# Patient Record
Sex: FEMALE | Race: WHITE | NOT HISPANIC OR LATINO
[De-identification: names, ages, dates, MRNs, and addresses within clinical notes are randomized per-mention and may not be internally consistent; named-entity substitution may affect disease eponyms.]

---

## 2017-01-19 ENCOUNTER — OTHER (OUTPATIENT)
Age: 42
End: 2017-01-19

## 2017-01-19 ENCOUNTER — MEDICATION RENEWAL (OUTPATIENT)
Age: 42
End: 2017-01-19

## 2017-02-03 ENCOUNTER — APPOINTMENT (OUTPATIENT)
Dept: OBGYN | Facility: CLINIC | Age: 42
End: 2017-02-03

## 2017-02-03 VITALS
HEIGHT: 68 IN | HEART RATE: 56 BPM | DIASTOLIC BLOOD PRESSURE: 77 MMHG | BODY MASS INDEX: 34.71 KG/M2 | WEIGHT: 229 LBS | SYSTOLIC BLOOD PRESSURE: 117 MMHG

## 2017-02-03 DIAGNOSIS — Z87.448 PERSONAL HISTORY OF OTHER DISEASES OF URINARY SYSTEM: ICD-10-CM

## 2017-02-03 DIAGNOSIS — N84.1 POLYP OF CERVIX UTERI: ICD-10-CM

## 2017-02-03 DIAGNOSIS — R10.2 PELVIC AND PERINEAL PAIN: ICD-10-CM

## 2017-02-03 DIAGNOSIS — N93.9 ABNORMAL UTERINE AND VAGINAL BLEEDING, UNSPECIFIED: ICD-10-CM

## 2017-02-03 DIAGNOSIS — Z78.9 OTHER SPECIFIED HEALTH STATUS: ICD-10-CM

## 2017-02-03 DIAGNOSIS — M79.661 PAIN IN RIGHT LOWER LEG: ICD-10-CM

## 2017-02-03 DIAGNOSIS — Z87.898 PERSONAL HISTORY OF OTHER SPECIFIED CONDITIONS: ICD-10-CM

## 2017-02-06 ENCOUNTER — OTHER (OUTPATIENT)
Age: 42
End: 2017-02-06

## 2017-02-06 LAB
HCG UR QL: NEGATIVE
HPV HIGH+LOW RISK DNA PNL CVX: NEGATIVE
QUALITY CONTROL: YES

## 2017-02-08 ENCOUNTER — OTHER (OUTPATIENT)
Age: 42
End: 2017-02-08

## 2017-02-08 LAB — CYTOLOGY CVX/VAG DOC THIN PREP: NORMAL

## 2017-03-31 ENCOUNTER — APPOINTMENT (OUTPATIENT)
Dept: ULTRASOUND IMAGING | Facility: CLINIC | Age: 42
End: 2017-03-31

## 2017-03-31 ENCOUNTER — OUTPATIENT (OUTPATIENT)
Dept: OUTPATIENT SERVICES | Facility: HOSPITAL | Age: 42
LOS: 1 days | End: 2017-03-31
Payer: COMMERCIAL

## 2017-03-31 ENCOUNTER — APPOINTMENT (OUTPATIENT)
Dept: MAMMOGRAPHY | Facility: CLINIC | Age: 42
End: 2017-03-31

## 2017-03-31 DIAGNOSIS — Z00.8 ENCOUNTER FOR OTHER GENERAL EXAMINATION: ICD-10-CM

## 2017-03-31 PROCEDURE — 76641 ULTRASOUND BREAST COMPLETE: CPT

## 2017-03-31 PROCEDURE — 77063 BREAST TOMOSYNTHESIS BI: CPT

## 2017-03-31 PROCEDURE — 77067 SCR MAMMO BI INCL CAD: CPT

## 2017-04-03 ENCOUNTER — OTHER (OUTPATIENT)
Age: 42
End: 2017-04-03

## 2017-04-09 DIAGNOSIS — N64.9 DISORDER OF BREAST, UNSPECIFIED: ICD-10-CM

## 2017-04-10 ENCOUNTER — APPOINTMENT (OUTPATIENT)
Dept: MAMMOGRAPHY | Facility: CLINIC | Age: 42
End: 2017-04-10

## 2017-04-10 ENCOUNTER — OUTPATIENT (OUTPATIENT)
Dept: OUTPATIENT SERVICES | Facility: HOSPITAL | Age: 42
LOS: 1 days | End: 2017-04-10
Payer: COMMERCIAL

## 2017-04-10 DIAGNOSIS — N63 UNSPECIFIED LUMP IN BREAST: ICD-10-CM

## 2017-04-10 DIAGNOSIS — Z00.8 ENCOUNTER FOR OTHER GENERAL EXAMINATION: ICD-10-CM

## 2017-04-10 PROCEDURE — 77065 DX MAMMO INCL CAD UNI: CPT

## 2017-04-10 PROCEDURE — G0279: CPT

## 2017-04-17 PROBLEM — N64.9 BREAST DISORDER: Status: ACTIVE | Noted: 2017-04-17

## 2017-04-24 ENCOUNTER — RESULT REVIEW (OUTPATIENT)
Age: 42
End: 2017-04-24

## 2017-04-25 ENCOUNTER — OUTPATIENT (OUTPATIENT)
Dept: OUTPATIENT SERVICES | Facility: HOSPITAL | Age: 42
LOS: 1 days | End: 2017-04-25
Payer: COMMERCIAL

## 2017-04-25 ENCOUNTER — APPOINTMENT (OUTPATIENT)
Dept: MAMMOGRAPHY | Facility: CLINIC | Age: 42
End: 2017-04-25

## 2017-04-25 DIAGNOSIS — N64.9 DISORDER OF BREAST, UNSPECIFIED: ICD-10-CM

## 2017-04-25 PROCEDURE — 19081 BX BREAST 1ST LESION STRTCTC: CPT

## 2017-04-25 PROCEDURE — 77065 DX MAMMO INCL CAD UNI: CPT

## 2017-05-01 ENCOUNTER — OTHER (OUTPATIENT)
Age: 42
End: 2017-05-01

## 2017-11-30 ENCOUNTER — RX RENEWAL (OUTPATIENT)
Age: 42
End: 2017-11-30

## 2017-12-04 ENCOUNTER — OTHER (OUTPATIENT)
Age: 42
End: 2017-12-04

## 2018-03-19 ENCOUNTER — OTHER (OUTPATIENT)
Age: 43
End: 2018-03-19

## 2018-05-23 ENCOUNTER — OTHER (OUTPATIENT)
Age: 43
End: 2018-05-23

## 2018-06-11 ENCOUNTER — RX RENEWAL (OUTPATIENT)
Age: 43
End: 2018-06-11

## 2018-09-24 ENCOUNTER — TRANSCRIPTION ENCOUNTER (OUTPATIENT)
Age: 43
End: 2018-09-24

## 2018-10-22 ENCOUNTER — APPOINTMENT (OUTPATIENT)
Dept: OBGYN | Facility: CLINIC | Age: 43
End: 2018-10-22
Payer: COMMERCIAL

## 2018-10-22 VITALS
WEIGHT: 238 LBS | HEART RATE: 62 BPM | BODY MASS INDEX: 36.07 KG/M2 | HEIGHT: 68 IN | SYSTOLIC BLOOD PRESSURE: 153 MMHG | DIASTOLIC BLOOD PRESSURE: 88 MMHG

## 2018-10-22 PROCEDURE — 99396 PREV VISIT EST AGE 40-64: CPT

## 2018-11-02 LAB
C TRACH RRNA SPEC QL NAA+PROBE: NOT DETECTED
N GONORRHOEA RRNA SPEC QL NAA+PROBE: NOT DETECTED
SOURCE AMPLIFICATION: NORMAL

## 2018-11-30 ENCOUNTER — OUTPATIENT (OUTPATIENT)
Dept: OUTPATIENT SERVICES | Facility: HOSPITAL | Age: 43
LOS: 1 days | End: 2018-11-30
Payer: COMMERCIAL

## 2018-11-30 ENCOUNTER — APPOINTMENT (OUTPATIENT)
Dept: MAMMOGRAPHY | Facility: CLINIC | Age: 43
End: 2018-11-30
Payer: COMMERCIAL

## 2018-11-30 DIAGNOSIS — Z00.8 ENCOUNTER FOR OTHER GENERAL EXAMINATION: ICD-10-CM

## 2018-11-30 PROCEDURE — G0279: CPT

## 2018-11-30 PROCEDURE — G0279: CPT | Mod: 26

## 2018-11-30 PROCEDURE — 77066 DX MAMMO INCL CAD BI: CPT | Mod: 26

## 2018-11-30 PROCEDURE — 77066 DX MAMMO INCL CAD BI: CPT

## 2019-02-04 ENCOUNTER — RX RENEWAL (OUTPATIENT)
Age: 44
End: 2019-02-04

## 2019-02-06 ENCOUNTER — MEDICATION RENEWAL (OUTPATIENT)
Age: 44
End: 2019-02-06

## 2019-04-21 NOTE — PHYSICAL EXAM
[Awake] : awake [Alert] : alert [Soft] : soft [Oriented x3] : oriented to person, place, and time [Labia Majora] : labia major [Labia Minora] : labia minora [Normal] : clitoris [No Bleeding] : there was no active vaginal bleeding [Uterine Adnexae] : were not tender and not enlarged [No Tenderness] : no rectal tenderness [Nl Sphincter Tone] : normal sphincter tone [RRR, No Murmurs] : RRR, no murmurs [CTAB] : CTAB [Acute Distress] : no acute distress [LAD] : no lymphadenopathy [Thyroid Nodule] : no thyroid nodule [Goiter] : no goiter [Mass] : no breast mass [Nipple Discharge] : no nipple discharge [Axillary LAD] : no axillary lymphadenopathy [Tender] : non tender [Distended] : not distended [H/Smegaly] : no hepatosplenomegaly [Depressed Mood] : not depressed [Flat Affect] : affect not flat [Pap Obtained] : a Pap smear was not performed [FreeTextEntry5] : cervical cultures obtained [FreeTextEntry9] : confirms vaginal exam

## 2019-10-28 ENCOUNTER — APPOINTMENT (OUTPATIENT)
Dept: OBGYN | Facility: CLINIC | Age: 44
End: 2019-10-28
Payer: COMMERCIAL

## 2019-10-28 VITALS
HEART RATE: 45 BPM | HEIGHT: 68 IN | WEIGHT: 225 LBS | SYSTOLIC BLOOD PRESSURE: 134 MMHG | DIASTOLIC BLOOD PRESSURE: 85 MMHG | BODY MASS INDEX: 34.1 KG/M2

## 2019-10-28 VITALS — SYSTOLIC BLOOD PRESSURE: 141 MMHG | DIASTOLIC BLOOD PRESSURE: 78 MMHG

## 2019-10-28 DIAGNOSIS — R45.86 EMOTIONAL LABILITY: ICD-10-CM

## 2019-10-28 PROCEDURE — 99396 PREV VISIT EST AGE 40-64: CPT

## 2019-10-28 PROCEDURE — 99213 OFFICE O/P EST LOW 20 MIN: CPT | Mod: 25

## 2019-12-05 ENCOUNTER — FORM ENCOUNTER (OUTPATIENT)
Age: 44
End: 2019-12-05

## 2019-12-06 ENCOUNTER — APPOINTMENT (OUTPATIENT)
Dept: ULTRASOUND IMAGING | Facility: CLINIC | Age: 44
End: 2019-12-06
Payer: COMMERCIAL

## 2019-12-06 ENCOUNTER — OUTPATIENT (OUTPATIENT)
Dept: OUTPATIENT SERVICES | Facility: HOSPITAL | Age: 44
LOS: 1 days | End: 2019-12-06
Payer: COMMERCIAL

## 2019-12-06 ENCOUNTER — APPOINTMENT (OUTPATIENT)
Dept: MAMMOGRAPHY | Facility: CLINIC | Age: 44
End: 2019-12-06
Payer: COMMERCIAL

## 2019-12-06 DIAGNOSIS — Z00.8 ENCOUNTER FOR OTHER GENERAL EXAMINATION: ICD-10-CM

## 2019-12-06 PROCEDURE — 77066 DX MAMMO INCL CAD BI: CPT

## 2019-12-06 PROCEDURE — 76641 ULTRASOUND BREAST COMPLETE: CPT | Mod: 26,50

## 2019-12-06 PROCEDURE — 76641 ULTRASOUND BREAST COMPLETE: CPT

## 2019-12-06 PROCEDURE — 77066 DX MAMMO INCL CAD BI: CPT | Mod: 26

## 2019-12-06 PROCEDURE — G0279: CPT

## 2019-12-06 PROCEDURE — G0279: CPT | Mod: 26

## 2019-12-20 PROBLEM — R45.86 MOOD SWINGS: Status: ACTIVE | Noted: 2019-12-20

## 2019-12-20 NOTE — PHYSICAL EXAM
[Awake] : awake [Alert] : alert [Soft] : soft [Oriented x3] : oriented to person, place, and time [Labia Majora] : labia major [Labia Minora] : labia minora [Normal] : clitoris [No Bleeding] : there was no active vaginal bleeding [Uterine Adnexae] : were not tender and not enlarged [No Tenderness] : no rectal tenderness [Nl Sphincter Tone] : normal sphincter tone [RRR, No Murmurs] : RRR, no murmurs [CTAB] : CTAB [Acute Distress] : no acute distress [LAD] : no lymphadenopathy [Goiter] : no goiter [Thyroid Nodule] : no thyroid nodule [Mass] : no breast mass [Nipple Discharge] : no nipple discharge [Tender] : non tender [Axillary LAD] : no axillary lymphadenopathy [Distended] : not distended [H/Smegaly] : no hepatosplenomegaly [Depressed Mood] : not depressed [Flat Affect] : affect not flat [Pap Obtained] : a Pap smear was not performed [FreeTextEntry5] : Cervical cultures obtained. [FreeTextEntry9] : Confirmed vaginal exam.

## 2019-12-20 NOTE — COUNSELING
[Breast Self Exam] : breast self exam [Menstrual Calendar] : menstrual calendar [Contraception] : contraception [FreeTextEntry2] : mood swings

## 2020-02-01 ENCOUNTER — EMERGENCY (EMERGENCY)
Facility: HOSPITAL | Age: 45
LOS: 0 days | Discharge: HOME | End: 2020-02-01
Attending: EMERGENCY MEDICINE
Payer: COMMERCIAL

## 2020-02-01 VITALS
DIASTOLIC BLOOD PRESSURE: 69 MMHG | SYSTOLIC BLOOD PRESSURE: 149 MMHG | RESPIRATION RATE: 18 BRPM | HEART RATE: 64 BPM | TEMPERATURE: 99 F | OXYGEN SATURATION: 99 %

## 2020-02-01 VITALS
HEIGHT: 68 IN | HEART RATE: 84 BPM | TEMPERATURE: 99 F | DIASTOLIC BLOOD PRESSURE: 106 MMHG | WEIGHT: 229.94 LBS | RESPIRATION RATE: 19 BRPM | SYSTOLIC BLOOD PRESSURE: 162 MMHG | OXYGEN SATURATION: 99 %

## 2020-02-01 DIAGNOSIS — Z88.6 ALLERGY STATUS TO ANALGESIC AGENT: ICD-10-CM

## 2020-02-01 DIAGNOSIS — R10.32 LEFT LOWER QUADRANT PAIN: ICD-10-CM

## 2020-02-01 DIAGNOSIS — N83.202 UNSPECIFIED OVARIAN CYST, LEFT SIDE: ICD-10-CM

## 2020-02-01 LAB
ALBUMIN SERPL ELPH-MCNC: 3.9 G/DL — SIGNIFICANT CHANGE UP (ref 3.3–5)
ALP SERPL-CCNC: 85 U/L — SIGNIFICANT CHANGE UP (ref 40–120)
ALT FLD-CCNC: 23 U/L — SIGNIFICANT CHANGE UP (ref 12–78)
ANION GAP SERPL CALC-SCNC: 2 MMOL/L — LOW (ref 5–17)
APPEARANCE UR: CLEAR — SIGNIFICANT CHANGE UP
AST SERPL-CCNC: 18 U/L — SIGNIFICANT CHANGE UP (ref 15–37)
BASOPHILS # BLD AUTO: 0.03 K/UL — SIGNIFICANT CHANGE UP (ref 0–0.2)
BASOPHILS NFR BLD AUTO: 0.6 % — SIGNIFICANT CHANGE UP (ref 0–2)
BILIRUB SERPL-MCNC: 0.3 MG/DL — SIGNIFICANT CHANGE UP (ref 0.2–1.2)
BILIRUB UR-MCNC: NEGATIVE — SIGNIFICANT CHANGE UP
BUN SERPL-MCNC: 9 MG/DL — SIGNIFICANT CHANGE UP (ref 7–23)
CALCIUM SERPL-MCNC: 8.9 MG/DL — SIGNIFICANT CHANGE UP (ref 8.5–10.1)
CHLORIDE SERPL-SCNC: 109 MMOL/L — HIGH (ref 96–108)
CO2 SERPL-SCNC: 28 MMOL/L — SIGNIFICANT CHANGE UP (ref 22–31)
COLOR SPEC: YELLOW — SIGNIFICANT CHANGE UP
CREAT SERPL-MCNC: 0.61 MG/DL — SIGNIFICANT CHANGE UP (ref 0.5–1.3)
DIFF PNL FLD: NEGATIVE — SIGNIFICANT CHANGE UP
EOSINOPHIL # BLD AUTO: 0.39 K/UL — SIGNIFICANT CHANGE UP (ref 0–0.5)
EOSINOPHIL NFR BLD AUTO: 8.3 % — HIGH (ref 0–6)
GLUCOSE SERPL-MCNC: 90 MG/DL — SIGNIFICANT CHANGE UP (ref 70–99)
GLUCOSE UR QL: NEGATIVE MG/DL — SIGNIFICANT CHANGE UP
HCG SERPL-ACNC: <1 MIU/ML — SIGNIFICANT CHANGE UP
HCT VFR BLD CALC: 44.5 % — SIGNIFICANT CHANGE UP (ref 34.5–45)
HGB BLD-MCNC: 14.3 G/DL — SIGNIFICANT CHANGE UP (ref 11.5–15.5)
IMM GRANULOCYTES NFR BLD AUTO: 0.2 % — SIGNIFICANT CHANGE UP (ref 0–1.5)
KETONES UR-MCNC: NEGATIVE — SIGNIFICANT CHANGE UP
LEUKOCYTE ESTERASE UR-ACNC: NEGATIVE — SIGNIFICANT CHANGE UP
LIDOCAIN IGE QN: 112 U/L — SIGNIFICANT CHANGE UP (ref 73–393)
LYMPHOCYTES # BLD AUTO: 1.67 K/UL — SIGNIFICANT CHANGE UP (ref 1–3.3)
LYMPHOCYTES # BLD AUTO: 35.4 % — SIGNIFICANT CHANGE UP (ref 13–44)
MCHC RBC-ENTMCNC: 29.7 PG — SIGNIFICANT CHANGE UP (ref 27–34)
MCHC RBC-ENTMCNC: 32.1 GM/DL — SIGNIFICANT CHANGE UP (ref 32–36)
MCV RBC AUTO: 92.5 FL — SIGNIFICANT CHANGE UP (ref 80–100)
MONOCYTES # BLD AUTO: 0.47 K/UL — SIGNIFICANT CHANGE UP (ref 0–0.9)
MONOCYTES NFR BLD AUTO: 10 % — SIGNIFICANT CHANGE UP (ref 2–14)
NEUTROPHILS # BLD AUTO: 2.15 K/UL — SIGNIFICANT CHANGE UP (ref 1.8–7.4)
NEUTROPHILS NFR BLD AUTO: 45.5 % — SIGNIFICANT CHANGE UP (ref 43–77)
NITRITE UR-MCNC: NEGATIVE — SIGNIFICANT CHANGE UP
PH UR: 8 — SIGNIFICANT CHANGE UP (ref 5–8)
PLATELET # BLD AUTO: 183 K/UL — SIGNIFICANT CHANGE UP (ref 150–400)
POTASSIUM SERPL-MCNC: 3.9 MMOL/L — SIGNIFICANT CHANGE UP (ref 3.5–5.3)
POTASSIUM SERPL-SCNC: 3.9 MMOL/L — SIGNIFICANT CHANGE UP (ref 3.5–5.3)
PROT SERPL-MCNC: 7.5 GM/DL — SIGNIFICANT CHANGE UP (ref 6–8.3)
PROT UR-MCNC: NEGATIVE MG/DL — SIGNIFICANT CHANGE UP
RBC # BLD: 4.81 M/UL — SIGNIFICANT CHANGE UP (ref 3.8–5.2)
RBC # FLD: 12.6 % — SIGNIFICANT CHANGE UP (ref 10.3–14.5)
SODIUM SERPL-SCNC: 139 MMOL/L — SIGNIFICANT CHANGE UP (ref 135–145)
SP GR SPEC: 1.01 — SIGNIFICANT CHANGE UP (ref 1.01–1.02)
UROBILINOGEN FLD QL: NEGATIVE MG/DL — SIGNIFICANT CHANGE UP
WBC # BLD: 4.72 K/UL — SIGNIFICANT CHANGE UP (ref 3.8–10.5)
WBC # FLD AUTO: 4.72 K/UL — SIGNIFICANT CHANGE UP (ref 3.8–10.5)

## 2020-02-01 PROCEDURE — 76830 TRANSVAGINAL US NON-OB: CPT | Mod: 26

## 2020-02-01 PROCEDURE — 99284 EMERGENCY DEPT VISIT MOD MDM: CPT

## 2020-02-01 PROCEDURE — 80053 COMPREHEN METABOLIC PANEL: CPT

## 2020-02-01 PROCEDURE — 76830 TRANSVAGINAL US NON-OB: CPT

## 2020-02-01 PROCEDURE — 84702 CHORIONIC GONADOTROPIN TEST: CPT

## 2020-02-01 PROCEDURE — 81003 URINALYSIS AUTO W/O SCOPE: CPT

## 2020-02-01 PROCEDURE — 99284 EMERGENCY DEPT VISIT MOD MDM: CPT | Mod: 25

## 2020-02-01 PROCEDURE — 36415 COLL VENOUS BLD VENIPUNCTURE: CPT

## 2020-02-01 PROCEDURE — 83690 ASSAY OF LIPASE: CPT

## 2020-02-01 PROCEDURE — 85025 COMPLETE CBC W/AUTO DIFF WBC: CPT

## 2020-02-01 RX ORDER — SODIUM CHLORIDE 9 MG/ML
1000 INJECTION INTRAMUSCULAR; INTRAVENOUS; SUBCUTANEOUS ONCE
Refills: 0 | Status: COMPLETED | OUTPATIENT
Start: 2020-02-01 | End: 2020-02-01

## 2020-02-01 RX ORDER — ACETAMINOPHEN 500 MG
975 TABLET ORAL ONCE
Refills: 0 | Status: COMPLETED | OUTPATIENT
Start: 2020-02-01 | End: 2020-02-01

## 2020-02-01 RX ADMIN — Medication 975 MILLIGRAM(S): at 03:51

## 2020-02-01 RX ADMIN — SODIUM CHLORIDE 1000 MILLILITER(S): 9 INJECTION INTRAMUSCULAR; INTRAVENOUS; SUBCUTANEOUS at 03:51

## 2020-02-01 NOTE — ED ADULT NURSE NOTE - CHIEF COMPLAINT QUOTE
PT P/W C/o  b/l lower abdominal pain associated with nausea x 5 days.  Denies fevers, chills, sob, cp.

## 2020-02-01 NOTE — CONSULT NOTE ADULT - SUBJECTIVE AND OBJECTIVE BOX
45 y/o F  and non significant PMH came to Ed for evaluation of LLQ pain. She report pain started at the end of  but was intermittent ans associated with mid cycle ovulation. She report that last Monday pain got worse on intensity and frequency but it resolved without intervention. During the next days she continues with mild on/off pain. She report that day previous to this admission 43 y/o F  and non significant PMH came to Ed for evaluation of LLQ pain. She report pain started at the end of  but was intermittent ans associated with mid cycle ovulation. She report that last Monday pain got worse on intensity and frequency but it resolved without intervention. During the next days she continues with mild on/off pain. She report that day previous to this admission started to have pain and call Dr Szymanski and make an appt for next week. She 45 y/o F  and non significant PMH came to Ed for evaluation of LLQ pain. She report pain started at the end of  but was intermittent ans associated with mid cycle ovulation. She report that last Monday pain got worse on intensity and frequency but it resolved without intervention. During the next days she continues with mild on/off pain. She report that day previous to this admission started to have pain and call Dr Davis and make an appt for next week. She wake up at 1 am with excrusiating LLQ pain of an intensity 8/10 and decide to came to ED for evaluation. She describe pain was constant, cramping and traveling to the anterior thigh and back. Denies dysuria, hematuria, dyspareunia fever, chills and any other symptoms.     At arrival to ED an US transvaginal  performed and showed a 6.8 cm cyst in the left ovary with possible incomplete torsion. She received Tylenol with improvement of the pain.     OBGYNx: 2  at term and w/o complications. No previous hx of ovaryan cys, abnormal pap smear, STD or other gynecological abnormalities.   PMHx: None   PSHx: none  FMx: mother  of uterine cancer with mets to ovary 43 y/o F  and non significant PMH came to Ed for evaluation of LLQ pain. She report pain started at the end of  but was intermittent ans associated with mid cycle ovulation. She report that last Monday pain got worse on intensity and frequency but it resolved without intervention. During the next days she continues with mild on/off pain. She report that day previous to this admission started to have pain and call Dr Davis and make an appt for next week. She wake up at 1 am with excrusiating LLQ pain of an intensity 8/10 and decide to came to ED for evaluation. She describe pain was constant, cramping and traveling to the anterior thigh and back. Denies dysuria, hematuria, dyspareunia fever, chills and any other symptoms.     At arrival to ED an US transvaginal  performed and showed a 6.8 cm cyst in the left ovary with possible incomplete torsion. She received Tylenol with improvement of the pain.     OBGYNx: 2  at term and w/o complications. No previous hx of ovaryan cyst, abnormal pap smear, STD or other gynecological abnormalities.   PMHx: None   PSHx: none  FMx: mother  of uterine leiomyosarcoma with mets to ovaries.   Allergies: Ibuprofen   Social hx: Denies use of alcohol, tobacco, illicit drugs.  with 2 children   Meds: OCPs       Vital Signs Last 24 Hrs  T(C): 37 (2020 06:00), Max: 37 (2020 02:47)  T(F): 98.6 (2020 06:00), Max: 98.6 (2020 02:47)  HR: 64 (2020 06:00) (64 - 84)  BP: 149/69 (2020 06:00) (149/69 - 162/106)  BP(mean): --  RR: 18 (2020 06:00) (18 - 19)  SpO2: 99% (2020 06:00) (99% - 99%)    General: AAOx3, no acute distress  Lungs: CTAx2 , no  wheezings or crackles  Amezcua: RRR, no murmurs  Abdomen: + BS, NT, ND, no guarding, no rebound, no palpable masses   Pelvic exam: small 8 week anteverted uterus, fulness in the left annexa. no tenderness         Complete Blood Count + Automated Diff (20 @ 03:25)    WBC Count: 4.72 K/uL    RBC Count: 4.81 M/uL    Hemoglobin: 14.3 g/dL    Hematocrit: 44.5 %    Mean Cell Volume: 92.5 fl    Mean Cell Hemoglobin: 29.7 pg    Mean Cell Hemoglobin Conc: 32.1 gm/dL    Red Cell Distrib Width: 12.6 %    Platelet Count - Automated: 183 K/uL    Auto Neutrophil #: 2.15 K/uL    Auto Lymphocyte #: 1.67 K/uL    Auto Monocyte #: 0.47 K/uL    Auto Eosinophil #: 0.39 K/uL    Auto Basophil #: 0.03 K/uL    Auto Neutrophil %: 45.5: Differential percentages must be correlated with absolute numbers for  clinical significance. %    Auto Lymphocyte %: 35.4 %    Auto Monocyte %: 10.0 %    Auto Eosinophil %: 8.3 %    Auto Basophil %: 0.6 %    Auto Immature Granulocyte %: 0.2 %       US Transvaginal (20 @ 03:59) >    IMPRESSION:    Enlarged left ovary secondary to a 6.8 cm cyst. Flow identified within the ovary at the time of imaging. Clinical correlation for intermittent torsion is recommended as this may serve as a lead point. Follow-up ultrasound imaging in 6-8 weeks is recommended to evaluate for cyst resolution.    < end of copied text > 43 y/o F  and non significant PMH came to Ed for evaluation of LLQ pain. She report pain started at the end of  but was intermittent ans associated with mid cycle ovulation. She report that last Monday pain got worse on intensity and frequency but it resolved without intervention. During the next days she continues with mild on/off pain. She report that day previous to this admission started to have pain and call Dr Davis and make an appt for next week. She wake up at 1 am with excrusiating LLQ pain of an intensity 8/10 and decide to came to ED for evaluation. She describe pain was constant, cramping and traveling to the anterior thigh and back. Denies dysuria, hematuria, dyspareunia fever, chills and any other symptoms.     At arrival to ED an US transvaginal  performed and showed a 6.8 cm cyst in the left ovary. She received Tylenol with improvement of the pain.     OBGYNx: 2  at term and w/o complications. No previous hx of ovaryan cyst, abnormal pap smear, STD or other gynecological abnormalities.   PMHx: None   PSHx: none  FMx: mother  of uterine leiomyosarcoma with mets to ovaries.   Allergies: Ibuprofen   Social hx: Denies use of alcohol, tobacco, illicit drugs.  with 2 children   Meds: OCPs       Vital Signs Last 24 Hrs  T(C): 37 (2020 06:00), Max: 37 (2020 02:47)  T(F): 98.6 (2020 06:00), Max: 98.6 (2020 02:47)  HR: 64 (2020 06:00) (64 - 84)  BP: 149/69 (2020 06:00) (149/69 - 162/106)  BP(mean): --  RR: 18 (2020 06:00) (18 - 19)  SpO2: 99% (2020 06:00) (99% - 99%)    General: AAOx3, no acute distress  Lungs: CTAx2 , no  wheezings or crackles  Amezcua: RRR, no murmurs  Abdomen: + BS, NT, ND, no guarding, no rebound, no palpable masses   Pelvic exam: small 8 week anteverted uterus, fulness in the left annexa. no tenderness         Complete Blood Count + Automated Diff (20 @ 03:25)    WBC Count: 4.72 K/uL    RBC Count: 4.81 M/uL    Hemoglobin: 14.3 g/dL    Hematocrit: 44.5 %    Mean Cell Volume: 92.5 fl    Mean Cell Hemoglobin: 29.7 pg    Mean Cell Hemoglobin Conc: 32.1 gm/dL    Red Cell Distrib Width: 12.6 %    Platelet Count - Automated: 183 K/uL    Auto Neutrophil #: 2.15 K/uL    Auto Lymphocyte #: 1.67 K/uL    Auto Monocyte #: 0.47 K/uL    Auto Eosinophil #: 0.39 K/uL    Auto Basophil #: 0.03 K/uL    Auto Neutrophil %: 45.5: Differential percentages must be correlated with absolute numbers for  clinical significance. %    Auto Lymphocyte %: 35.4 %    Auto Monocyte %: 10.0 %    Auto Eosinophil %: 8.3 %    Auto Basophil %: 0.6 %    Auto Immature Granulocyte %: 0.2 %       US Transvaginal (20 @ 03:59) >    IMPRESSION:    Enlarged left ovary secondary to a 6.8 cm cyst. Flow identified within the ovary at the time of imaging. Clinical correlation for intermittent torsion is recommended as this may serve as a lead point. Follow-up ultrasound imaging in 6-8 weeks is recommended to evaluate for cyst resolution.    < end of copied text >

## 2020-02-01 NOTE — ED ADULT NURSE NOTE - NSIMPLEMENTINTERV_GEN_ALL_ED
Implemented All Universal Safety Interventions:  [place] to call system. Call bell, personal items and telephone within reach. Instruct patient to call for assistance. Room bathroom lighting operational. Non-slip footwear when patient is off stretcher. Physically safe environment: no spills, clutter or unnecessary equipment. Stretcher in lowest position, wheels locked, appropriate side rails in place.

## 2020-02-01 NOTE — ED PROVIDER NOTE - NSFOLLOWUPINSTRUCTIONS_ED_ALL_ED_FT
please follow up with your doctor in 2 days as scheduled.   drink plenty of fluids.   return to ED for any concerns

## 2020-02-01 NOTE — CONSULT NOTE ADULT - ASSESSMENT
43 y/o F  and non significant PMH came to Ed for evaluation of LLQ pain and found with left ovarian cyst     # Left ovarian cyst   - simple cyst  -Dx: Follicular vs adenoma   - 6.8 cm with intermittent torsion   - No acute pain at this moment or concerns of torsion   - No acuet abdomen  - Torsion instructions reviewed   - Pt counseled to continue follow up with OBGYN outpatient in 3 days  ( Dr Elkins)

## 2020-02-01 NOTE — ED PROVIDER NOTE - PATIENT PORTAL LINK FT
You can access the FollowMyHealth Patient Portal offered by [facility] by registering at the following website: [email or web address]. By joining [facility]’s FollowMyHealth portal, you will also be able to view your health information using other applications (apps) compatible with our system.

## 2020-02-01 NOTE — ED PROVIDER NOTE - OBJECTIVE STATEMENT
45 y/o female in ED c/o intermittent lower abd pain x 2 wks worse tonight.    pt states pain comes in waves.    states made appt with her gyn MD in 2 days but tonight pain worse so decided to come to ED for eval.   pt states no meds taken for pain.   pt denies any fever, HA, cp, sob, n/v/d.    tolerating PO.    no sick contacts or recent travel

## 2020-02-01 NOTE — CONSULT NOTE ADULT - ATTENDING COMMENTS
45 yo  with LLQ pain first diagnosis of ovarian cyst on US today. Pt no longer in pain.  US shows good blood flow.  Abdomen non acute and my pelvic exam shows ovary non-tender and in left cul-de-sac.    No sign of torsion at present. Pt has apt with her gynecologist in 3 days.  Torsion precautions reviewed with patient.  Discharge home.

## 2020-02-01 NOTE — ED PROVIDER NOTE - PROGRESS NOTE DETAILS
results noted.   pt told of results.   states no current pain but agree with OB consult.   case d/w OB and will evaluate pt pt evaluated by OB attending and states pt current benign exam and has appt with her OB MD in 2 days.   recommend d/c home with OB f/u and torsion precautions.

## 2020-02-03 ENCOUNTER — APPOINTMENT (OUTPATIENT)
Dept: OBGYN | Facility: CLINIC | Age: 45
End: 2020-02-03
Payer: COMMERCIAL

## 2020-02-03 VITALS
HEIGHT: 68 IN | BODY MASS INDEX: 35.22 KG/M2 | SYSTOLIC BLOOD PRESSURE: 178 MMHG | HEART RATE: 69 BPM | WEIGHT: 232.38 LBS | DIASTOLIC BLOOD PRESSURE: 97 MMHG

## 2020-02-03 DIAGNOSIS — Z30.9 ENCOUNTER FOR CONTRACEPTIVE MANAGEMENT, UNSPECIFIED: ICD-10-CM

## 2020-02-03 PROCEDURE — 99213 OFFICE O/P EST LOW 20 MIN: CPT

## 2020-02-04 PROBLEM — Z78.9 OTHER SPECIFIED HEALTH STATUS: Chronic | Status: ACTIVE | Noted: 2020-02-01

## 2020-02-14 ENCOUNTER — APPOINTMENT (OUTPATIENT)
Dept: OBGYN | Facility: CLINIC | Age: 45
End: 2020-02-14
Payer: COMMERCIAL

## 2020-02-14 VITALS
WEIGHT: 235 LBS | HEART RATE: 76 BPM | SYSTOLIC BLOOD PRESSURE: 155 MMHG | HEIGHT: 68 IN | DIASTOLIC BLOOD PRESSURE: 100 MMHG | BODY MASS INDEX: 35.61 KG/M2

## 2020-02-14 PROCEDURE — 99212 OFFICE O/P EST SF 10 MIN: CPT

## 2020-02-14 RX ORDER — NORETHINDRONE ACETATE AND ETHINYL ESTRADIOL, ETHINYL ESTRADIOL AND FERROUS FUMARATE 1MG-10(24)
1 MG-10 MCG / KIT ORAL DAILY
Qty: 3 | Refills: 0 | Status: DISCONTINUED | COMMUNITY
Start: 2017-12-04 | End: 2020-02-14

## 2020-02-14 RX ORDER — NORETHINDRONE ACETATE AND ETHINYL ESTRADIOL, ETHINYL ESTRADIOL AND FERROUS FUMARATE 1MG-10(24)
1 MG-10 MCG / KIT ORAL DAILY
Qty: 28 | Refills: 0 | Status: DISCONTINUED | COMMUNITY
Start: 2017-02-03 | End: 2020-02-14

## 2020-02-14 RX ORDER — NORETHINDRONE ACETATE AND ETHINYL ESTRADIOL, ETHINYL ESTRADIOL AND FERROUS FUMARATE 1MG-10(24)
1 MG-10 MCG / KIT ORAL
Qty: 3 | Refills: 3 | Status: DISCONTINUED | COMMUNITY
Start: 2018-05-23 | End: 2020-02-14

## 2020-11-02 ENCOUNTER — APPOINTMENT (OUTPATIENT)
Dept: OBGYN | Facility: CLINIC | Age: 45
End: 2020-11-02
Payer: COMMERCIAL

## 2020-11-02 VITALS
HEIGHT: 68 IN | SYSTOLIC BLOOD PRESSURE: 134 MMHG | DIASTOLIC BLOOD PRESSURE: 84 MMHG | BODY MASS INDEX: 31.52 KG/M2 | HEART RATE: 76 BPM | WEIGHT: 208 LBS

## 2020-11-02 DIAGNOSIS — N60.02 SOLITARY CYST OF RIGHT BREAST: ICD-10-CM

## 2020-11-02 DIAGNOSIS — Z01.419 ENCOUNTER FOR GYNECOLOGICAL EXAMINATION (GENERAL) (ROUTINE) W/OUT ABNORMAL FINDINGS: ICD-10-CM

## 2020-11-02 DIAGNOSIS — N60.01 SOLITARY CYST OF RIGHT BREAST: ICD-10-CM

## 2020-11-02 DIAGNOSIS — N84.1 POLYP OF CERVIX UTERI: ICD-10-CM

## 2020-11-02 PROCEDURE — 99072 ADDL SUPL MATRL&STAF TM PHE: CPT

## 2020-11-02 PROCEDURE — 57500 BIOPSY OF CERVIX: CPT

## 2020-11-02 PROCEDURE — 99396 PREV VISIT EST AGE 40-64: CPT | Mod: 25

## 2020-11-02 RX ORDER — ASCORBIC ACID 500 MG
TABLET ORAL
Refills: 0 | Status: ACTIVE | COMMUNITY

## 2020-11-02 RX ORDER — ZINC SULFATE 50(220)MG
CAPSULE ORAL
Refills: 0 | Status: ACTIVE | COMMUNITY

## 2020-11-02 RX ORDER — PNV NO.95/FERROUS FUM/FOLIC AC 28MG-0.8MG
TABLET ORAL
Refills: 0 | Status: ACTIVE | COMMUNITY

## 2020-11-02 RX ORDER — CHROMIUM 200 MCG
TABLET ORAL
Refills: 0 | Status: ACTIVE | COMMUNITY

## 2020-11-06 LAB
C TRACH RRNA SPEC QL NAA+PROBE: NOT DETECTED
CORE LAB BIOPSY: NORMAL
CYTOLOGY CVX/VAG DOC THIN PREP: ABNORMAL
HPV HIGH+LOW RISK DNA PNL CVX: NOT DETECTED
N GONORRHOEA RRNA SPEC QL NAA+PROBE: NOT DETECTED
SOURCE AMPLIFICATION: NORMAL

## 2021-03-01 ENCOUNTER — APPOINTMENT (OUTPATIENT)
Dept: MAMMOGRAPHY | Facility: CLINIC | Age: 46
End: 2021-03-01
Payer: COMMERCIAL

## 2021-03-01 ENCOUNTER — APPOINTMENT (OUTPATIENT)
Dept: ULTRASOUND IMAGING | Facility: CLINIC | Age: 46
End: 2021-03-01
Payer: COMMERCIAL

## 2021-03-01 ENCOUNTER — RESULT REVIEW (OUTPATIENT)
Age: 46
End: 2021-03-01

## 2021-03-01 ENCOUNTER — OUTPATIENT (OUTPATIENT)
Dept: OUTPATIENT SERVICES | Facility: HOSPITAL | Age: 46
LOS: 1 days | End: 2021-03-01
Payer: COMMERCIAL

## 2021-03-01 DIAGNOSIS — N60.01 SOLITARY CYST OF RIGHT BREAST: ICD-10-CM

## 2021-03-01 DIAGNOSIS — Z01.419 ENCOUNTER FOR GYNECOLOGICAL EXAMINATION (GENERAL) (ROUTINE) WITHOUT ABNORMAL FINDINGS: ICD-10-CM

## 2021-03-01 DIAGNOSIS — R92.2 INCONCLUSIVE MAMMOGRAM: ICD-10-CM

## 2021-03-01 PROCEDURE — 76641 ULTRASOUND BREAST COMPLETE: CPT | Mod: 26,50

## 2021-03-01 PROCEDURE — 77067 SCR MAMMO BI INCL CAD: CPT | Mod: 26

## 2021-03-01 PROCEDURE — 76641 ULTRASOUND BREAST COMPLETE: CPT

## 2021-03-01 PROCEDURE — 77063 BREAST TOMOSYNTHESIS BI: CPT | Mod: 26

## 2021-03-01 PROCEDURE — 77063 BREAST TOMOSYNTHESIS BI: CPT

## 2021-03-01 PROCEDURE — 77067 SCR MAMMO BI INCL CAD: CPT

## 2021-11-08 ENCOUNTER — APPOINTMENT (OUTPATIENT)
Dept: OBGYN | Facility: CLINIC | Age: 46
End: 2021-11-08
Payer: COMMERCIAL

## 2021-11-08 VITALS
SYSTOLIC BLOOD PRESSURE: 156 MMHG | BODY MASS INDEX: 35.77 KG/M2 | WEIGHT: 236 LBS | TEMPERATURE: 97.2 F | DIASTOLIC BLOOD PRESSURE: 95 MMHG | HEIGHT: 68 IN | HEART RATE: 57 BPM

## 2021-11-08 VITALS — DIASTOLIC BLOOD PRESSURE: 79 MMHG | SYSTOLIC BLOOD PRESSURE: 147 MMHG

## 2021-11-08 DIAGNOSIS — Z12.39 ENCOUNTER FOR OTHER SCREENING FOR MALIGNANT NEOPLASM OF BREAST: ICD-10-CM

## 2021-11-08 PROCEDURE — 99396 PREV VISIT EST AGE 40-64: CPT

## 2021-11-08 RX ORDER — FLUOCINONIDE 0.5 MG/G
0.05 CREAM TOPICAL
Qty: 60 | Refills: 0 | Status: COMPLETED | COMMUNITY
Start: 2017-01-27 | End: 2021-11-08

## 2021-11-13 NOTE — COUNSELING
[Breast Self Exam] : breast self exam [Other ___] : [unfilled] [Nutrition/ Exercise/ Weight Management] : nutrition, exercise, weight management [Contraception/ Emergency Contraception/ Safe Sexual Practices] : contraception, emergency contraception, safe sexual practices

## 2021-11-13 NOTE — HISTORY OF PRESENT ILLNESS
[N] : Patient reports normal menses [Condoms] : uses condoms [Y] : Patient is sexually active [Monogamous (Male Partner)] : is monogamous with a male partner [FreeTextEntry1] : 46 year old patient, LMP 10/20/2021, presents today for annual gyn visit and offers no complaints. \par \par Pt has yuval vaccinated for COVID-19.  [Mammogramdate] : 03/2021 [BreastSonogramDate] : 03/2021 [PapSmeardate] : 11/2020 [LMPDate] : 10/20/2021

## 2021-11-13 NOTE — PHYSICAL EXAM
[Chaperone Present] : A chaperone was present in the examining room during all aspects of the physical examination [Appropriately responsive] : appropriately responsive [Alert] : alert [No Acute Distress] : no acute distress [No Lymphadenopathy] : no lymphadenopathy [Regular Rate Rhythm] : regular rate rhythm [No Murmurs] : no murmurs [Clear to Auscultation B/L] : clear to auscultation bilaterally [Soft] : soft [Non-tender] : non-tender [Non-distended] : non-distended [No HSM] : No HSM [No Lesions] : no lesions [No Mass] : no mass [Oriented x3] : oriented x3 [Examination Of The Breasts] : a normal appearance [No Masses] : no breast masses were palpable [Labia Majora] : normal [Labia Minora] : normal [Normal] : normal [Uterine Adnexae] : normal [No Tenderness] : no tenderness [Nl Sphincter Tone] : normal sphincter tone [No Discharge] : no discharge [FreeTextEntry3] : thyroid wnl [FreeTextEntry5] : cervical cultures obtained [FreeTextEntry9] : Confirms vaginal exam.

## 2021-11-25 ENCOUNTER — TRANSCRIPTION ENCOUNTER (OUTPATIENT)
Age: 46
End: 2021-11-25

## 2021-12-08 ENCOUNTER — OUTPATIENT (OUTPATIENT)
Dept: OUTPATIENT SERVICES | Facility: HOSPITAL | Age: 46
LOS: 1 days | End: 2021-12-08
Payer: COMMERCIAL

## 2021-12-08 ENCOUNTER — APPOINTMENT (OUTPATIENT)
Dept: MRI IMAGING | Facility: CLINIC | Age: 46
End: 2021-12-08
Payer: COMMERCIAL

## 2021-12-08 DIAGNOSIS — Z01.419 ENCOUNTER FOR GYNECOLOGICAL EXAMINATION (GENERAL) (ROUTINE) WITHOUT ABNORMAL FINDINGS: ICD-10-CM

## 2021-12-08 DIAGNOSIS — R92.2 INCONCLUSIVE MAMMOGRAM: ICD-10-CM

## 2021-12-08 DIAGNOSIS — Z00.8 ENCOUNTER FOR OTHER GENERAL EXAMINATION: ICD-10-CM

## 2021-12-08 PROCEDURE — C8908: CPT

## 2021-12-08 PROCEDURE — A9585: CPT

## 2021-12-08 PROCEDURE — 77049 MRI BREAST C-+ W/CAD BI: CPT | Mod: 26

## 2021-12-08 PROCEDURE — C8937: CPT

## 2022-05-02 ENCOUNTER — NON-APPOINTMENT (OUTPATIENT)
Age: 47
End: 2022-05-02

## 2022-05-02 ENCOUNTER — APPOINTMENT (OUTPATIENT)
Dept: FAMILY MEDICINE | Facility: CLINIC | Age: 47
End: 2022-05-02
Payer: COMMERCIAL

## 2022-05-02 VITALS
DIASTOLIC BLOOD PRESSURE: 88 MMHG | WEIGHT: 235 LBS | OXYGEN SATURATION: 99 % | HEIGHT: 67 IN | TEMPERATURE: 97.3 F | HEART RATE: 68 BPM | BODY MASS INDEX: 36.88 KG/M2 | SYSTOLIC BLOOD PRESSURE: 132 MMHG

## 2022-05-02 DIAGNOSIS — M54.2 CERVICALGIA: ICD-10-CM

## 2022-05-02 DIAGNOSIS — Z11.59 ENCOUNTER FOR SCREENING FOR OTHER VIRAL DISEASES: ICD-10-CM

## 2022-05-02 PROCEDURE — 36415 COLL VENOUS BLD VENIPUNCTURE: CPT

## 2022-05-02 PROCEDURE — 99213 OFFICE O/P EST LOW 20 MIN: CPT | Mod: 25

## 2022-05-02 NOTE — PLAN
[FreeTextEntry1] : See assessment.\par Labs drawn in office.\par Thyroid US.\par Further recommendations pending work up.

## 2022-05-02 NOTE — ASSESSMENT
[FreeTextEntry1] : Patient is a 47yo female presenting to the office complaining of approximately 4 weeks right anterior neck discomfort around level of thyroid.  Had preceding URI symptoms x2 weeks.  Pt also mentions metallic smell to menses over the past few months which is abnormal for her.  Pt states menses are regular, not on OCPs, no pelvic or abdominal discomfort.\par \par Right sided anterior neck pain\par - Labs drawn in office.\par - Thyroid US.\par - Acetaminophen as needed for discomfort.\par - Gentle stretching (pt going to chiropractor this week).\par - Further recommendations pending above work up.\par \par Metallic smell during menses\par - Will check iron studies.\par - Follow up with GYN for further evaluation/treatment.\par \par Call the office or go to the ED immediately if you develop new, worsening or concerning symptoms including high fever, severe headache/worst headache of your life, confusion, dizziness/lightheadedness, loss of consciousness, severe chest pain, difficulty breathing, shortness of breath, severe abdominal pain, excessive vomiting/diarrhea, inability to feel/move the extremities, or any other concerning symptoms.\par

## 2022-05-02 NOTE — HEALTH RISK ASSESSMENT
[Former] : Former [Yes] : Yes [2 - 4 times a month (2 pts)] : 2-4 times a month (2 points) [1 or 2 (0 pts)] : 1 or 2 (0 points) [Never (0 pts)] : Never (0 points) [No] : In the past 12 months have you used drugs other than those required for medical reasons? No [No falls in past year] : Patient reported no falls in the past year [0] : 2) Feeling down, depressed, or hopeless: Not at all (0) [PHQ-2 Negative - No further assessment needed] : PHQ-2 Negative - No further assessment needed [YearQuit] : 2001 [Audit-CScore] : 2 [de-identified] : exercises regularly, walk, strength training [de-identified] : vegan, does admit to increased sugar [[id number]] : 0

## 2022-05-02 NOTE — HISTORY OF PRESENT ILLNESS
[FreeTextEntry8] : Patient is a 45yo female presenting to the office with concern for right anterior neck discomfort.\par Pt reports approximately 4 weeks of mild tenderness to the right side of the neck around the level of the thyroid.\par Pt states there is no pain, it just feels tender.\par Pt states for the first 2 weeks she was having intermittent choking sensation without vomiting.\par Pt states she had mild cold symptoms for 2+ weeks prior to neck discomfort starting.\par pt denies swelling to the area\par has had some night sweats, around menses, has had in the past when breastfeeding and feels is hormonal related.\par Denies fever, ST, or cough.\par Denies pain/stiffness in the posterior neck.\par Denies weight loss.\par Denies prior history of thyroid issue.\par No family history of thyroid issues.\par Mother , history of endometrial cancer, pt states because she did not take care of herself.\par Pt mentions a friend who was diagnosed with a rare thyroid cancer with mets secondary to exposure to burn pits in [place].\par \par Pt also mentions metallic smell with menses over the past few months which she has never had in the past.  Menses occur regularly.  Not on OCPs.  Denies pelvic/abdominal discomfort.

## 2022-05-02 NOTE — PHYSICAL EXAM
[No JVD] : no jugular venous distention [No Edema] : there was no peripheral edema [Normal] : no rash [Coordination Grossly Intact] : coordination grossly intact [No Focal Deficits] : no focal deficits [Normal Gait] : normal gait [Normal Affect] : the affect was normal [Normal Insight/Judgement] : insight and judgment were intact [de-identified] : no gross deformities, no erythema, not warm to touch, no LAD, no palpable thyroid nodules, thyroid does not feel enlarged, non-tender on exam. [de-identified] : no midline or paraspinal cervical muscle tenderness.  full ROM.

## 2022-05-03 ENCOUNTER — TRANSCRIPTION ENCOUNTER (OUTPATIENT)
Age: 47
End: 2022-05-03

## 2022-05-03 LAB
25(OH)D3 SERPL-MCNC: 35.2 NG/ML
ALBUMIN SERPL ELPH-MCNC: 4.7 G/DL
ALP BLD-CCNC: 84 U/L
ALT SERPL-CCNC: 17 U/L
ANION GAP SERPL CALC-SCNC: 11 MMOL/L
AST SERPL-CCNC: 20 U/L
BASOPHILS # BLD AUTO: 0.04 K/UL
BASOPHILS NFR BLD AUTO: 0.8 %
BILIRUB SERPL-MCNC: 0.4 MG/DL
BUN SERPL-MCNC: 7 MG/DL
CALCIUM SERPL-MCNC: 10.3 MG/DL
CHLORIDE SERPL-SCNC: 103 MMOL/L
CO2 SERPL-SCNC: 26 MMOL/L
COVID-19 SPIKE DOMAIN ANTIBODY INTERPRETATION: POSITIVE
CREAT SERPL-MCNC: 0.58 MG/DL
EGFR: 114 ML/MIN/1.73M2
EOSINOPHIL # BLD AUTO: 0.32 K/UL
EOSINOPHIL NFR BLD AUTO: 6.5 %
ESTIMATED AVERAGE GLUCOSE: 103 MG/DL
FERRITIN SERPL-MCNC: 34 NG/ML
FOLATE SERPL-MCNC: >20 NG/ML
GLUCOSE SERPL-MCNC: 66 MG/DL
HBA1C MFR BLD HPLC: 5.2 %
HCT VFR BLD CALC: 43.7 %
HGB BLD-MCNC: 14 G/DL
IMM GRANULOCYTES NFR BLD AUTO: 0.4 %
IRON SATN MFR SERPL: 24 %
IRON SERPL-MCNC: 105 UG/DL
LYMPHOCYTES # BLD AUTO: 1.35 K/UL
LYMPHOCYTES NFR BLD AUTO: 27.4 %
MAN DIFF?: NORMAL
MCHC RBC-ENTMCNC: 30.3 PG
MCHC RBC-ENTMCNC: 32 GM/DL
MCV RBC AUTO: 94.6 FL
MONOCYTES # BLD AUTO: 0.57 K/UL
MONOCYTES NFR BLD AUTO: 11.6 %
NEUTROPHILS # BLD AUTO: 2.63 K/UL
NEUTROPHILS NFR BLD AUTO: 53.3 %
PLATELET # BLD AUTO: 169 K/UL
POTASSIUM SERPL-SCNC: 5.1 MMOL/L
PROT SERPL-MCNC: 7.2 G/DL
RBC # BLD: 4.62 M/UL
RBC # FLD: 12.9 %
SARS-COV-2 AB SERPL IA-ACNC: >250 U/ML
SODIUM SERPL-SCNC: 140 MMOL/L
T4 FREE SERPL-MCNC: 1.1 NG/DL
TIBC SERPL-MCNC: 438 UG/DL
TSH SERPL-ACNC: 2.23 UIU/ML
UIBC SERPL-MCNC: 333 UG/DL
VIT B12 SERPL-MCNC: 769 PG/ML
WBC # FLD AUTO: 4.93 K/UL

## 2022-05-04 ENCOUNTER — RESULT REVIEW (OUTPATIENT)
Age: 47
End: 2022-05-04

## 2022-05-04 ENCOUNTER — APPOINTMENT (OUTPATIENT)
Dept: ULTRASOUND IMAGING | Facility: CLINIC | Age: 47
End: 2022-05-04
Payer: COMMERCIAL

## 2022-05-04 ENCOUNTER — OUTPATIENT (OUTPATIENT)
Dept: OUTPATIENT SERVICES | Facility: HOSPITAL | Age: 47
LOS: 1 days | End: 2022-05-04
Payer: COMMERCIAL

## 2022-05-04 ENCOUNTER — APPOINTMENT (OUTPATIENT)
Dept: MAMMOGRAPHY | Facility: CLINIC | Age: 47
End: 2022-05-04
Payer: COMMERCIAL

## 2022-05-04 DIAGNOSIS — Z01.419 ENCOUNTER FOR GYNECOLOGICAL EXAMINATION (GENERAL) (ROUTINE) WITHOUT ABNORMAL FINDINGS: ICD-10-CM

## 2022-05-04 DIAGNOSIS — Z00.8 ENCOUNTER FOR OTHER GENERAL EXAMINATION: ICD-10-CM

## 2022-05-04 DIAGNOSIS — M54.2 CERVICALGIA: ICD-10-CM

## 2022-05-04 PROCEDURE — 77063 BREAST TOMOSYNTHESIS BI: CPT | Mod: 26

## 2022-05-04 PROCEDURE — 76641 ULTRASOUND BREAST COMPLETE: CPT | Mod: 26,50

## 2022-05-04 PROCEDURE — 77067 SCR MAMMO BI INCL CAD: CPT | Mod: 26

## 2022-05-04 PROCEDURE — 76641 ULTRASOUND BREAST COMPLETE: CPT

## 2022-05-04 PROCEDURE — 77067 SCR MAMMO BI INCL CAD: CPT

## 2022-05-04 PROCEDURE — 76536 US EXAM OF HEAD AND NECK: CPT | Mod: 26

## 2022-05-04 PROCEDURE — 76536 US EXAM OF HEAD AND NECK: CPT

## 2022-05-04 PROCEDURE — 77063 BREAST TOMOSYNTHESIS BI: CPT

## 2022-05-10 ENCOUNTER — TRANSCRIPTION ENCOUNTER (OUTPATIENT)
Age: 47
End: 2022-05-10

## 2022-05-11 ENCOUNTER — RESULT REVIEW (OUTPATIENT)
Age: 47
End: 2022-05-11

## 2022-05-11 ENCOUNTER — OUTPATIENT (OUTPATIENT)
Dept: OUTPATIENT SERVICES | Facility: HOSPITAL | Age: 47
LOS: 1 days | End: 2022-05-11
Payer: COMMERCIAL

## 2022-05-11 ENCOUNTER — APPOINTMENT (OUTPATIENT)
Dept: MAMMOGRAPHY | Facility: CLINIC | Age: 47
End: 2022-05-11
Payer: COMMERCIAL

## 2022-05-11 DIAGNOSIS — Z00.8 ENCOUNTER FOR OTHER GENERAL EXAMINATION: ICD-10-CM

## 2022-05-11 PROCEDURE — 77065 DX MAMMO INCL CAD UNI: CPT | Mod: 26,RT

## 2022-05-11 PROCEDURE — 77065 DX MAMMO INCL CAD UNI: CPT

## 2022-05-12 ENCOUNTER — NON-APPOINTMENT (OUTPATIENT)
Age: 47
End: 2022-05-12

## 2022-05-23 DIAGNOSIS — R92.8 OTHER ABNORMAL AND INCONCLUSIVE FINDINGS ON DIAGNOSTIC IMAGING OF BREAST: ICD-10-CM

## 2022-05-24 ENCOUNTER — NON-APPOINTMENT (OUTPATIENT)
Age: 47
End: 2022-05-24

## 2022-09-24 DIAGNOSIS — Z87.891 PERSONAL HISTORY OF NICOTINE DEPENDENCE: ICD-10-CM

## 2022-09-27 ENCOUNTER — APPOINTMENT (OUTPATIENT)
Dept: FAMILY MEDICINE | Facility: CLINIC | Age: 47
End: 2022-09-27

## 2022-10-25 ENCOUNTER — RESULT CHARGE (OUTPATIENT)
Age: 47
End: 2022-10-25

## 2022-10-26 ENCOUNTER — APPOINTMENT (OUTPATIENT)
Dept: FAMILY MEDICINE | Facility: CLINIC | Age: 47
End: 2022-10-26

## 2022-10-26 ENCOUNTER — NON-APPOINTMENT (OUTPATIENT)
Age: 47
End: 2022-10-26

## 2022-10-26 VITALS
OXYGEN SATURATION: 100 % | HEIGHT: 67 IN | WEIGHT: 237 LBS | SYSTOLIC BLOOD PRESSURE: 124 MMHG | DIASTOLIC BLOOD PRESSURE: 82 MMHG | TEMPERATURE: 96.9 F | HEART RATE: 70 BPM | BODY MASS INDEX: 37.2 KG/M2

## 2022-10-26 DIAGNOSIS — R00.1 BRADYCARDIA, UNSPECIFIED: ICD-10-CM

## 2022-10-26 DIAGNOSIS — Z86.39 PERSONAL HISTORY OF OTHER ENDOCRINE, NUTRITIONAL AND METABOLIC DISEASE: ICD-10-CM

## 2022-10-26 DIAGNOSIS — Z00.00 ENCOUNTER FOR GENERAL ADULT MEDICAL EXAMINATION W/OUT ABNORMAL FINDINGS: ICD-10-CM

## 2022-10-26 PROCEDURE — 99396 PREV VISIT EST AGE 40-64: CPT | Mod: 25

## 2022-10-26 PROCEDURE — 36415 COLL VENOUS BLD VENIPUNCTURE: CPT

## 2022-10-26 PROCEDURE — 93000 ELECTROCARDIOGRAM COMPLETE: CPT

## 2022-10-26 NOTE — REVIEW OF SYSTEMS
[Negative] : Heme/Lymph [Fever] : no fever [Chills] : no chills [Fatigue] : no fatigue [Night Sweats] : no night sweats [Recent Change In Weight] : ~T recent weight change [FreeTextEntry2] : +wgt loss in 2020 when she had more time for self care and exercise and has regained the lost weight over past 2 yrs as not as much time for exercise. TSH wnl spring 2022

## 2022-10-26 NOTE — HISTORY OF PRESENT ILLNESS
[de-identified] : Her last physical exam was 1/2019\par \par Vaccines:\par Tetanus is up to date; last Tdap 9/2015\par COVID vaccine-- had J&J and also COVID infection and has great Ab level\par \par Her last dentist visit was less than one year ago\par Her last eye doctor appointment was less than one year ago\par Her last dermatologist visit was less than one year ago\par \par GYN visit is up to date; last 11/8/2021 (Dr. 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

## 2022-10-26 NOTE — PHYSICAL EXAM

## 2022-10-26 NOTE — HEALTH RISK ASSESSMENT
[0] : 2) Feeling down, depressed, or hopeless: Not at all (0) [PHQ-2 Negative - No further assessment needed] : PHQ-2 Negative - No further assessment needed [[id number]] : 0

## 2022-10-27 ENCOUNTER — TRANSCRIPTION ENCOUNTER (OUTPATIENT)
Age: 47
End: 2022-10-27

## 2022-10-27 LAB
CHOLEST SERPL-MCNC: 181 MG/DL
HDLC SERPL-MCNC: 74 MG/DL
LDLC SERPL CALC-MCNC: 97 MG/DL
NONHDLC SERPL-MCNC: 107 MG/DL
TRIGL SERPL-MCNC: 49 MG/DL

## 2022-12-05 ENCOUNTER — APPOINTMENT (OUTPATIENT)
Dept: OBGYN | Facility: CLINIC | Age: 47
End: 2022-12-05
Payer: COMMERCIAL

## 2022-12-05 VITALS
WEIGHT: 240 LBS | OXYGEN SATURATION: 99 % | RESPIRATION RATE: 18 BRPM | SYSTOLIC BLOOD PRESSURE: 130 MMHG | HEIGHT: 67 IN | BODY MASS INDEX: 37.67 KG/M2 | DIASTOLIC BLOOD PRESSURE: 90 MMHG

## 2022-12-05 PROCEDURE — 99396 PREV VISIT EST AGE 40-64: CPT

## 2022-12-31 NOTE — PHYSICAL EXAM
[Appropriately responsive] : appropriately responsive [Alert] : alert [No Acute Distress] : no acute distress [No Lymphadenopathy] : no lymphadenopathy [Regular Rate Rhythm] : regular rate rhythm [No Murmurs] : no murmurs [Clear to Auscultation B/L] : clear to auscultation bilaterally [Soft] : soft [Non-tender] : non-tender [Non-distended] : non-distended [No HSM] : No HSM [No Lesions] : no lesions [No Mass] : no mass [Oriented x3] : oriented x3 [Examination Of The Breasts] : a normal appearance [No Discharge] : no discharge [No Masses] : no breast masses were palpable [Labia Majora] : normal [Labia Minora] : normal [Normal] : normal [No Tenderness] : no tenderness [Nl Sphincter Tone] : normal sphincter tone [Anteversion] : anteverted [Uterine Adnexae] : non-palpable [FreeTextEntry9] : confirms vaginal exam

## 2022-12-31 NOTE — HISTORY OF PRESENT ILLNESS
[Delivery Date: ___] : on [unfilled] [Boy] : baby is a boy [Infant's Name ___] : [unfilled] [___ Lbs] : [unfilled] lbs [___ Oz] : [unfilled] oz [Not Circumcised] : not circumcised [Living at Home] : is currently living at home [Breastfeeding] : breastfeeding [Resumed Menses] : has not resumed her menses [Resumed [address]] : has not resumed intercourse [Intended Contraception] : Intended Contraception: [de-identified] : Breast milk and formula [FreeTextEntry1] : 47 year old female presents for annual GYN exam. She reports LMP 11/14/22 reg menses, and offers no complaints. She reports using condoms for contraception and wishes to continue using this method.

## 2022-12-31 NOTE — HISTORY OF PRESENT ILLNESS
[Delivery Date: ___] : on [unfilled] [Boy] : baby is a boy [Infant's Name ___] : [unfilled] [___ Lbs] : [unfilled] lbs [___ Oz] : [unfilled] oz [Not Circumcised] : not circumcised [Living at Home] : is currently living at home [Breastfeeding] : breastfeeding [Resumed Menses] : has not resumed her menses [Resumed [place]] : has not resumed intercourse [Intended Contraception] : Intended Contraception: [de-identified] : Breast milk and formula [FreeTextEntry1] : 47 year old female presents for annual GYN exam. She reports LMP 11/14/22 reg menses, and offers no complaints. She reports using condoms for contraception and wishes to continue using this method.

## 2023-05-31 NOTE — ED ADULT TRIAGE NOTE - PAIN RATING/NUMBER SCALE (0-10): ACTIVITY
Previous Infection Text: The patient has recovered from a previous COVID-19 infection.\\n3/20 Has The Patient Been Vaccinated For Covid-19?: Yes Detail Level: Simple Which Covid 19 Vaccine Did Patient Receive (Optional)?: Moderna Has The Patient Been Infected With Covid-19 In The Past?: No 6

## 2023-07-14 ENCOUNTER — APPOINTMENT (OUTPATIENT)
Dept: FAMILY MEDICINE | Facility: CLINIC | Age: 48
End: 2023-07-14
Payer: COMMERCIAL

## 2023-07-14 VITALS
SYSTOLIC BLOOD PRESSURE: 122 MMHG | DIASTOLIC BLOOD PRESSURE: 84 MMHG | TEMPERATURE: 97.1 F | HEIGHT: 67 IN | WEIGHT: 247 LBS | HEART RATE: 74 BPM | BODY MASS INDEX: 38.77 KG/M2 | OXYGEN SATURATION: 100 %

## 2023-07-14 DIAGNOSIS — E55.9 VITAMIN D DEFICIENCY, UNSPECIFIED: ICD-10-CM

## 2023-07-14 DIAGNOSIS — Z00.00 ENCOUNTER FOR GENERAL ADULT MEDICAL EXAMINATION W/OUT ABNORMAL FINDINGS: ICD-10-CM

## 2023-07-14 DIAGNOSIS — M25.50 PAIN IN UNSPECIFIED JOINT: ICD-10-CM

## 2023-07-14 DIAGNOSIS — R21 RASH AND OTHER NONSPECIFIC SKIN ERUPTION: ICD-10-CM

## 2023-07-14 DIAGNOSIS — Z78.9 OTHER SPECIFIED HEALTH STATUS: ICD-10-CM

## 2023-07-14 PROCEDURE — 99214 OFFICE O/P EST MOD 30 MIN: CPT | Mod: 25

## 2023-07-14 PROCEDURE — 36415 COLL VENOUS BLD VENIPUNCTURE: CPT

## 2023-07-14 RX ORDER — MOMETASONE FUROATE 1 MG/G
0.1 OINTMENT TOPICAL DAILY
Qty: 1 | Refills: 1 | Status: ACTIVE | COMMUNITY
Start: 2023-07-14 | End: 1900-01-01

## 2023-07-14 NOTE — PHYSICAL EXAM
[Normal] : normal rate, regular rhythm, normal S1 and S2 and no murmur heard [de-identified] : red raised rash over right shin, possible small hives

## 2023-07-14 NOTE — HEALTH RISK ASSESSMENT
[0] : 2) Feeling down, depressed, or hopeless: Not at all (0) [PHQ-2 Negative - No further assessment needed] : PHQ-2 Negative - No further assessment needed [Never] : Never [[id number]] : 0

## 2023-07-14 NOTE — REVIEW OF SYSTEMS
[Joint Pain] : joint pain [Muscle Pain] : muscle pain [Itching] : Itching [Skin Rash] : skin rash [Negative] : Gastrointestinal [Joint Stiffness] : no joint stiffness [Joint Swelling] : no joint swelling [Muscle Weakness] : no muscle weakness [Back Pain] : no back pain

## 2023-07-14 NOTE — HISTORY OF PRESENT ILLNESS
[FreeTextEntry8] : ACute care visit\par Pt is here with joint pain and a rash. Pt states she has been having this problem since June.\par \par She states she noticed a raised red itchy rash on left shin around jan2023.  The rash is still present and itchy.  She then started having left knee pain, then left hip, and left elbow pains. Also reports  intermittent numbing sensation on right side of thigh.\par \par Pain in left knee is worse with walking, especially walking up stairs.  She states she is very active, very busy.  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 \par \par Also feeling tired, decreased energy.  \par \par Vegan since 2006

## 2023-07-18 LAB
25(OH)D3 SERPL-MCNC: 24.7 NG/ML
A PHAGOCYTOPH IGG TITR SER IF: NORMAL TITER
ALBUMIN SERPL ELPH-MCNC: 4.5 G/DL
ALP BLD-CCNC: 88 U/L
ALT SERPL-CCNC: 12 U/L
ANA SER IF-ACNC: NEGATIVE
ANION GAP SERPL CALC-SCNC: 11 MMOL/L
AST SERPL-CCNC: 16 U/L
B BURGDOR AB SER QL IA: NEGATIVE
B MICROTI IGG TITR SER: NORMAL TITER
BILIRUB SERPL-MCNC: 0.6 MG/DL
BUN SERPL-MCNC: 7 MG/DL
CALCIUM SERPL-MCNC: 9.9 MG/DL
CHLORIDE SERPL-SCNC: 103 MMOL/L
CHOLEST SERPL-MCNC: 176 MG/DL
CO2 SERPL-SCNC: 25 MMOL/L
CREAT SERPL-MCNC: 0.63 MG/DL
E CHAFFEENSIS IGG TITR SER IF: NORMAL TITER
EGFR: 111 ML/MIN/1.73M2
GLUCOSE SERPL-MCNC: 90 MG/DL
HDLC SERPL-MCNC: 69 MG/DL
LDLC SERPL CALC-MCNC: 93 MG/DL
NONHDLC SERPL-MCNC: 108 MG/DL
POTASSIUM SERPL-SCNC: 4.7 MMOL/L
PROT SERPL-MCNC: 7 G/DL
RHEUMATOID FACT SER QL: <10 IU/ML
SODIUM SERPL-SCNC: 139 MMOL/L
TRIGL SERPL-MCNC: 83 MG/DL
TSH SERPL-ACNC: 2.88 UIU/ML
VIT B12 SERPL-MCNC: 382 PG/ML

## 2023-07-27 DIAGNOSIS — N92.6 IRREGULAR MENSTRUATION, UNSPECIFIED: ICD-10-CM

## 2023-07-27 DIAGNOSIS — Z72.51 HIGH RISK HETEROSEXUAL BEHAVIOR: ICD-10-CM

## 2023-08-15 PROBLEM — N92.6 IRREGULAR MENSES: Status: ACTIVE | Noted: 2023-08-15

## 2023-08-15 PROBLEM — Z72.51 UNPROTECTED SEXUAL INTERCOURSE: Status: ACTIVE | Noted: 2023-08-15

## 2023-08-21 ENCOUNTER — APPOINTMENT (OUTPATIENT)
Dept: OBGYN | Facility: CLINIC | Age: 48
End: 2023-08-21

## 2023-08-21 LAB
FSH SERPL-MCNC: 4.4 IU/L
HCG SERPL-MCNC: <1 MIU/ML
T4 FREE SERPL-MCNC: 1.3 NG/DL
TSH SERPL-ACNC: 2.46 UIU/ML

## 2023-08-30 ENCOUNTER — TRANSCRIPTION ENCOUNTER (OUTPATIENT)
Age: 48
End: 2023-08-30

## 2023-11-16 NOTE — ED ADULT TRIAGE NOTE - CHIEF COMPLAINT QUOTE
PT P/W C/o  b/l lower abdominal pain associated with nausea x 5 days.  Denies fevers, chills, sob, cp. 27.3

## 2023-12-26 ENCOUNTER — APPOINTMENT (OUTPATIENT)
Dept: OBGYN | Facility: CLINIC | Age: 48
End: 2023-12-26
Payer: COMMERCIAL

## 2023-12-26 VITALS
BODY MASS INDEX: 41.12 KG/M2 | HEART RATE: 67 BPM | SYSTOLIC BLOOD PRESSURE: 140 MMHG | DIASTOLIC BLOOD PRESSURE: 78 MMHG | WEIGHT: 262 LBS | HEIGHT: 67 IN

## 2023-12-26 DIAGNOSIS — R92.2 INCONCLUSIVE MAMMOGRAM: ICD-10-CM

## 2023-12-26 DIAGNOSIS — Z01.419 ENCOUNTER FOR GYNECOLOGICAL EXAMINATION (GENERAL) (ROUTINE) W/OUT ABNORMAL FINDINGS: ICD-10-CM

## 2023-12-26 DIAGNOSIS — R92.30 INCONCLUSIVE MAMMOGRAM: ICD-10-CM

## 2023-12-26 PROCEDURE — 99396 PREV VISIT EST AGE 40-64: CPT

## 2023-12-30 PROBLEM — R92.2 DENSE BREASTS: Status: ACTIVE | Noted: 2019-10-28

## 2023-12-30 PROBLEM — Z01.419 WOMEN'S ANNUAL ROUTINE GYNECOLOGICAL EXAMINATION: Status: ACTIVE | Noted: 2021-11-08

## 2023-12-30 NOTE — PHYSICAL EXAM
[Chaperone Present] : A chaperone was present in the examining room during all aspects of the physical examination [Appropriately responsive] : appropriately responsive [Alert] : alert [No Acute Distress] : no acute distress [No Lymphadenopathy] : no lymphadenopathy [Regular Rate Rhythm] : regular rate rhythm [No Murmurs] : no murmurs [Clear to Auscultation B/L] : clear to auscultation bilaterally [Soft] : soft [Non-tender] : non-tender [Non-distended] : non-distended [No HSM] : No HSM [No Lesions] : no lesions [No Mass] : no mass [Oriented x3] : oriented x3 [Examination Of The Breasts] : a normal appearance [Breast Palpation Diffuse Fibrous Tissue Bilateral] : fibrocystic changes [No Discharge] : no discharge [No Masses] : no breast masses were palpable [Labia Majora] : normal [Labia Minora] : normal [Discharge] : discharge [Scant] : scant [Bloody] : bloody [Normal] : normal [Uterine Adnexae] : normal [FreeTextEntry2] : rash and varicose veins on bilateral LE  [No Tenderness] : no tenderness [Nl Sphincter Tone] : normal sphincter tone [FreeTextEntry5] : pap, hpv and cervical cultures obtained [FreeTextEntry9] : Confirms vaginal exam.

## 2023-12-30 NOTE — HISTORY OF PRESENT ILLNESS
[Condoms] : uses condoms [Y] : Patient is sexually active [Monogamous (Male Partner)] : is monogamous with a male partner [FreeTextEntry1] : 48 year old LMP 12/20/2023 presents for annual gyn visit. Pt feels well and has no complaints. She has normal menses. She denies intermenstrual bleeding or abnormal discharge.  [Mammogramdate] : 5/22 [BreastSonogramDate] : 5/22 [PapSmeardate] : 11/20

## 2023-12-30 NOTE — END OF VISIT
[FreeTextEntry3] : I, Maricarmen Leila, acted as a scribe on behalf of Dr. Shruthi Lewis on 12/26/2023 .  All medical entries made by the scribe were at my, Dr. Shruthi Lewis, direction and personally dictated by me on 12/26/2023 .. I have reviewed the chart and agree that the record accurately reflects my personal performance of the history, physical exam, assessment and plan. I have also personally directed, reviewed, and agreed with the chart.

## 2023-12-30 NOTE — PLAN
[FreeTextEntry1] : HPV/Pap performed today Referral given for mammo/sono Breast MRI referral if TC lifetime risk >20% RTO 1 year for annual

## 2023-12-30 NOTE — ASSESSMENT
[TextEntry] :  Pleasant 48 year old with a normal gyn exam. Blood work from August 2023 were reviewed with the patient and reassured that all were WNL. Rash on the bilateral LE with varicose veins noted on physical examination. Pt was advised to consult Dermatology for the rash and Vascular Surgery for the varicose veins.

## 2024-01-01 LAB
C TRACH RRNA SPEC QL NAA+PROBE: NOT DETECTED
CYTOLOGY CVX/VAG DOC THIN PREP: NORMAL
HPV HIGH+LOW RISK DNA PNL CVX: NOT DETECTED
N GONORRHOEA RRNA SPEC QL NAA+PROBE: NOT DETECTED
SOURCE AMPLIFICATION: NORMAL

## 2024-05-06 ENCOUNTER — RESULT REVIEW (OUTPATIENT)
Age: 49
End: 2024-05-06

## 2024-05-06 ENCOUNTER — OUTPATIENT (OUTPATIENT)
Dept: OUTPATIENT SERVICES | Facility: HOSPITAL | Age: 49
LOS: 1 days | End: 2024-05-06
Payer: COMMERCIAL

## 2024-05-06 ENCOUNTER — APPOINTMENT (OUTPATIENT)
Dept: MAMMOGRAPHY | Facility: CLINIC | Age: 49
End: 2024-05-06
Payer: COMMERCIAL

## 2024-05-06 ENCOUNTER — APPOINTMENT (OUTPATIENT)
Dept: ULTRASOUND IMAGING | Facility: CLINIC | Age: 49
End: 2024-05-06
Payer: COMMERCIAL

## 2024-05-06 DIAGNOSIS — Z01.419 ENCOUNTER FOR GYNECOLOGICAL EXAMINATION (GENERAL) (ROUTINE) WITHOUT ABNORMAL FINDINGS: ICD-10-CM

## 2024-05-06 DIAGNOSIS — Z00.8 ENCOUNTER FOR OTHER GENERAL EXAMINATION: ICD-10-CM

## 2024-05-06 PROCEDURE — 77063 BREAST TOMOSYNTHESIS BI: CPT | Mod: 26

## 2024-05-06 PROCEDURE — 77063 BREAST TOMOSYNTHESIS BI: CPT

## 2024-05-06 PROCEDURE — 77067 SCR MAMMO BI INCL CAD: CPT | Mod: 26

## 2024-05-06 PROCEDURE — 76641 ULTRASOUND BREAST COMPLETE: CPT

## 2024-05-06 PROCEDURE — 76641 ULTRASOUND BREAST COMPLETE: CPT | Mod: 26,50

## 2024-05-06 PROCEDURE — 77067 SCR MAMMO BI INCL CAD: CPT

## 2024-06-11 ENCOUNTER — APPOINTMENT (OUTPATIENT)
Dept: GASTROENTEROLOGY | Facility: CLINIC | Age: 49
End: 2024-06-11
Payer: COMMERCIAL

## 2024-06-11 VITALS
SYSTOLIC BLOOD PRESSURE: 148 MMHG | WEIGHT: 260 LBS | HEIGHT: 67 IN | DIASTOLIC BLOOD PRESSURE: 82 MMHG | BODY MASS INDEX: 40.81 KG/M2

## 2024-06-11 DIAGNOSIS — Z12.11 ENCOUNTER FOR SCREENING FOR MALIGNANT NEOPLASM OF COLON: ICD-10-CM

## 2024-06-11 PROCEDURE — 99203 OFFICE O/P NEW LOW 30 MIN: CPT

## 2024-06-11 RX ORDER — SODIUM SULFATE, POTASSIUM SULFATE AND MAGNESIUM SULFATE 1.6; 3.13; 17.5 G/177ML; G/177ML; G/177ML
17.5-3.13-1.6 SOLUTION ORAL
Qty: 1 | Refills: 0 | Status: ACTIVE | COMMUNITY
Start: 2024-06-11 | End: 1900-01-01

## 2024-06-11 NOTE — PHYSICAL EXAM
[Alert] : alert [Healthy Appearing] : healthy appearing [Sclera] : the sclera and conjunctiva were normal [Hearing Threshold Finger Rub Not [place]] : hearing was normal [Normal Appearance] : the appearance of the neck was normal [No Respiratory Distress] : no respiratory distress [Auscultation Breath Sounds / Voice Sounds] : lungs were clear to auscultation bilaterally [Heart Rate And Rhythm] : heart rate was normal and rhythm regular [Bowel Sounds] : normal bowel sounds [Abdomen Tenderness] : non-tender [Abdomen Soft] : soft

## 2024-06-12 NOTE — ASSESSMENT
[FreeTextEntry1] : Plan: Since pt has never had screening colonoscopy previously, would recommend. Risks versus benefits as well as instructions reviewed, pt agrees to planned procedure.    I, Dr. Anthony, personally performed the evaluation and management (E/M) services for this new patient. That E/M includes conducting the initial examination, assessing all conditions, and establishing the plan of care. Today, my NPP, Keely Davenport, was here to observe my evaluation and management services for this patient to be followed going forward

## 2024-06-12 NOTE — HISTORY OF PRESENT ILLNESS
[FreeTextEntry1] : Zoë Izaguirre is a 48 year old female presents today for consult for screening colonoscopy. Denies FMH of CRC. Denies bowel complaints, typically has bowel movements regularly without significant straining or overt bleeding such as melena or hematochezia. Denies upper GI symptoms such as GERD, nausea, vomiting, or dysphagia. Denies unintentional weight loss.

## 2024-07-15 ENCOUNTER — RESULT CHARGE (OUTPATIENT)
Age: 49
End: 2024-07-15

## 2024-07-16 ENCOUNTER — APPOINTMENT (OUTPATIENT)
Dept: FAMILY MEDICINE | Facility: CLINIC | Age: 49
End: 2024-07-16
Payer: COMMERCIAL

## 2024-07-16 ENCOUNTER — NON-APPOINTMENT (OUTPATIENT)
Age: 49
End: 2024-07-16

## 2024-07-16 VITALS
SYSTOLIC BLOOD PRESSURE: 142 MMHG | DIASTOLIC BLOOD PRESSURE: 72 MMHG | TEMPERATURE: 97.9 F | WEIGHT: 268 LBS | HEART RATE: 73 BPM | HEIGHT: 67 IN | BODY MASS INDEX: 42.06 KG/M2 | OXYGEN SATURATION: 98 %

## 2024-07-16 DIAGNOSIS — B30.9 VIRAL CONJUNCTIVITIS, UNSPECIFIED: ICD-10-CM

## 2024-07-16 DIAGNOSIS — Z00.00 ENCOUNTER FOR GENERAL ADULT MEDICAL EXAMINATION W/OUT ABNORMAL FINDINGS: ICD-10-CM

## 2024-07-16 DIAGNOSIS — E55.9 VITAMIN D DEFICIENCY, UNSPECIFIED: ICD-10-CM

## 2024-07-16 DIAGNOSIS — L25.8 UNSPECIFIED CONTACT DERMATITIS DUE TO OTHER AGENTS: ICD-10-CM

## 2024-07-16 PROCEDURE — 36415 COLL VENOUS BLD VENIPUNCTURE: CPT

## 2024-07-16 PROCEDURE — 93000 ELECTROCARDIOGRAM COMPLETE: CPT

## 2024-07-16 PROCEDURE — 99396 PREV VISIT EST AGE 40-64: CPT

## 2024-07-16 RX ORDER — CIPROFLOXACIN 3 MG/ML
0.3 SOLUTION OPHTHALMIC EVERY 4 HOURS
Qty: 1 | Refills: 0 | Status: ACTIVE | COMMUNITY
Start: 2024-07-16 | End: 1900-01-01

## 2024-07-17 ENCOUNTER — TRANSCRIPTION ENCOUNTER (OUTPATIENT)
Age: 49
End: 2024-07-17

## 2024-07-17 LAB
25(OH)D3 SERPL-MCNC: 25.9 NG/ML
ALBUMIN SERPL ELPH-MCNC: 4.6 G/DL
ALP BLD-CCNC: 91 U/L
ALT SERPL-CCNC: 16 U/L
ANION GAP SERPL CALC-SCNC: 11 MMOL/L
AST SERPL-CCNC: 19 U/L
BASOPHILS # BLD AUTO: 0.03 K/UL
BASOPHILS NFR BLD AUTO: 0.6 %
BILIRUB SERPL-MCNC: 0.5 MG/DL
BUN SERPL-MCNC: 8 MG/DL
CALCIUM SERPL-MCNC: 9.5 MG/DL
CHLORIDE SERPL-SCNC: 103 MMOL/L
CHOLEST SERPL-MCNC: 218 MG/DL
CO2 SERPL-SCNC: 23 MMOL/L
CREAT SERPL-MCNC: 0.56 MG/DL
EGFR: 113 ML/MIN/1.73M2
EOSINOPHIL # BLD AUTO: 0.32 K/UL
EOSINOPHIL NFR BLD AUTO: 6.4 %
GLUCOSE SERPL-MCNC: 86 MG/DL
HCT VFR BLD CALC: 44.4 %
HDLC SERPL-MCNC: 81 MG/DL
HGB BLD-MCNC: 13.7 G/DL
IMM GRANULOCYTES NFR BLD AUTO: 0.2 %
LDLC SERPL CALC-MCNC: 123 MG/DL
LYMPHOCYTES # BLD AUTO: 1.19 K/UL
LYMPHOCYTES NFR BLD AUTO: 23.9 %
MAN DIFF?: NORMAL
MCHC RBC-ENTMCNC: 30 PG
MCHC RBC-ENTMCNC: 30.9 GM/DL
MCV RBC AUTO: 97.2 FL
MONOCYTES # BLD AUTO: 0.42 K/UL
MONOCYTES NFR BLD AUTO: 8.5 %
NEUTROPHILS # BLD AUTO: 3 K/UL
NEUTROPHILS NFR BLD AUTO: 60.4 %
NONHDLC SERPL-MCNC: 137 MG/DL
PLATELET # BLD AUTO: 154 K/UL
POTASSIUM SERPL-SCNC: 4.6 MMOL/L
PROT SERPL-MCNC: 7.4 G/DL
RBC # BLD: 4.57 M/UL
RBC # FLD: 13.9 %
SODIUM SERPL-SCNC: 137 MMOL/L
TRIGL SERPL-MCNC: 82 MG/DL
TSH SERPL-ACNC: 3.17 UIU/ML
VIT B12 SERPL-MCNC: 409 PG/ML
WBC # FLD AUTO: 4.97 K/UL

## 2024-07-23 ENCOUNTER — APPOINTMENT (OUTPATIENT)
Dept: GASTROENTEROLOGY | Facility: AMBULATORY MEDICAL SERVICES | Age: 49
End: 2024-07-23
Payer: COMMERCIAL

## 2024-07-23 PROCEDURE — 45378 DIAGNOSTIC COLONOSCOPY: CPT

## 2024-09-20 ENCOUNTER — OFFICE (OUTPATIENT)
Dept: URBAN - METROPOLITAN AREA CLINIC 12 | Facility: CLINIC | Age: 49
Setting detail: OPHTHALMOLOGY
End: 2024-09-20
Payer: COMMERCIAL
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
## 2024-09-20 DIAGNOSIS — H01.004: ICD-10-CM

## 2024-09-20 DIAGNOSIS — H01.001: ICD-10-CM

## 2024-09-20 PROCEDURE — 99203 OFFICE O/P NEW LOW 30 MIN: CPT | Performed by: STUDENT IN AN ORGANIZED HEALTH CARE EDUCATION/TRAINING PROGRAM

## 2024-09-20 PROCEDURE — BRUDER EYE BRUDER EYE PADS: Performed by: STUDENT IN AN ORGANIZED HEALTH CARE EDUCATION/TRAINING PROGRAM

## 2024-09-20 ASSESSMENT — CONFRONTATIONAL VISUAL FIELD TEST (CVF)
OD_FINDINGS: FULL
OS_FINDINGS: FULL

## 2024-09-20 ASSESSMENT — LID EXAM ASSESSMENTS
OS_BLEPHARITIS: LUL 1+
OD_BLEPHARITIS: RUL 1+

## 2024-10-18 ENCOUNTER — RX ONLY (RX ONLY)
Age: 49
End: 2024-10-18

## 2024-10-18 ENCOUNTER — OFFICE (OUTPATIENT)
Dept: URBAN - METROPOLITAN AREA CLINIC 12 | Facility: CLINIC | Age: 49
Setting detail: OPHTHALMOLOGY
End: 2024-10-18
Payer: COMMERCIAL

## 2024-10-18 DIAGNOSIS — H01.001: ICD-10-CM

## 2024-10-18 DIAGNOSIS — H01.004: ICD-10-CM

## 2024-10-18 PROCEDURE — 92012 INTRM OPH EXAM EST PATIENT: CPT | Performed by: STUDENT IN AN ORGANIZED HEALTH CARE EDUCATION/TRAINING PROGRAM

## 2024-10-18 ASSESSMENT — REFRACTION_AUTOREFRACTION
OD_AXIS: 069
OD_CYLINDER: -0.25
OS_SPHERE: -1.50
OS_AXIS: 000
OS_CYLINDER: 0.00
OD_SPHERE: -1.75

## 2024-10-18 ASSESSMENT — KERATOMETRY
OD_K1POWER_DIOPTERS: 41.25
OD_K2POWER_DIOPTERS: 41.75
OD_AXISANGLE_DEGREES: 087
OS_K1POWER_DIOPTERS: 41.25
OS_K2POWER_DIOPTERS: 41.75
OS_AXISANGLE_DEGREES: 104

## 2024-10-18 ASSESSMENT — LID EXAM ASSESSMENTS
OS_BLEPHARITIS: LUL 1+
OD_BLEPHARITIS: RUL 1+

## 2024-10-18 ASSESSMENT — CONFRONTATIONAL VISUAL FIELD TEST (CVF)
OS_FINDINGS: FULL
OD_FINDINGS: FULL

## 2024-10-18 ASSESSMENT — VISUAL ACUITY
OD_BCVA: 20/20
OS_BCVA: 20/20

## 2024-12-20 ENCOUNTER — APPOINTMENT (OUTPATIENT)
Dept: OBGYN | Facility: CLINIC | Age: 49
End: 2024-12-20

## 2024-12-20 VITALS
SYSTOLIC BLOOD PRESSURE: 137 MMHG | HEIGHT: 67 IN | WEIGHT: 275 LBS | DIASTOLIC BLOOD PRESSURE: 93 MMHG | HEART RATE: 73 BPM | BODY MASS INDEX: 43.16 KG/M2

## 2024-12-20 VITALS — SYSTOLIC BLOOD PRESSURE: 140 MMHG | DIASTOLIC BLOOD PRESSURE: 91 MMHG

## 2024-12-20 DIAGNOSIS — Z01.419 ENCOUNTER FOR GYNECOLOGICAL EXAMINATION (GENERAL) (ROUTINE) W/OUT ABNORMAL FINDINGS: ICD-10-CM

## 2024-12-20 PROCEDURE — 99396 PREV VISIT EST AGE 40-64: CPT

## 2025-01-01 NOTE — PLAN
Will Padilla  Pulmonary Disease  [address] [place], [place] [address]  Phone: [phone]  Fax: [phone]  Follow Up Time: 2 weeks   Anam Avalos  Critical Care Medicine  [address] [place] [address]  Phone: [phone]  Fax: [phone]  Scheduled Appointment: 01/13/2025 09:30 AM    Fiorella Hanson)  Internal Medicine  [address]  [place] [address]  Phone: [phone]  Fax: [phone]  Scheduled Appointment: 01/09/2025 12:00 PM   [FreeTextEntry1] : Continue all medications as prescribed. Check labs as above (just needs lipids today as had CBC, CMP, A1C, TSH, vit D etc all checked with WALTER Alexander in 5/2022 and wnl/fine). Will adjust any medications based upon lab results.\par \par Reviewed age-appropriate preventive screening tests with patient. UTD on gyn exam (due again now and has appt scheduled), mammogram. She is due for baseline CRC screening and we revd pros/cons of colonoscopy vs. Cologuard and she will consider what she prefers to do/check on insurance coverage and schedule for near future. \par \par ECG SB/low voltage (stable from prior and normal for her). Flu vacc revd/recommended and she defers. \par \par Discussed clean eating (eg Mediterranean style eating plan) and regular exercise/staying as physically active as possible.  Include balance exercises and strength training and core strengthening exercises for bone health and to decrease risk for falls. Revd that focus should be on eating as cleanly as she can and keeping as physically active as possible and this will get easier to do as her kids get older but for now just focus on doing the best she can with clean eating and physical activity and try to not focus on number on scale\par \par Reviewed importance of good self care (e.g. meditation, yoga, adequate rest, regular exercise, magnesium, clean eating, etc.).\par \par Follow up for next physical in 1-3 years.

## 2025-01-06 ENCOUNTER — APPOINTMENT (OUTPATIENT)
Dept: OBGYN | Facility: CLINIC | Age: 50
End: 2025-01-06

## 2025-08-07 DIAGNOSIS — E78.5 HYPERLIPIDEMIA, UNSPECIFIED: ICD-10-CM

## 2025-08-07 DIAGNOSIS — R03.0 ELEVATED BLOOD-PRESSURE READING, W/OUT DIAGNOSIS OF HYPERTENSION: ICD-10-CM

## 2025-08-15 ENCOUNTER — APPOINTMENT (OUTPATIENT)
Dept: FAMILY MEDICINE | Facility: CLINIC | Age: 50
End: 2025-08-15